# Patient Record
Sex: FEMALE | Race: WHITE | HISPANIC OR LATINO
[De-identification: names, ages, dates, MRNs, and addresses within clinical notes are randomized per-mention and may not be internally consistent; named-entity substitution may affect disease eponyms.]

---

## 2022-01-24 PROBLEM — Z00.00 ENCOUNTER FOR PREVENTIVE HEALTH EXAMINATION: Status: ACTIVE | Noted: 2022-01-24

## 2022-01-26 ENCOUNTER — NON-APPOINTMENT (OUTPATIENT)
Age: 27
End: 2022-01-26

## 2022-01-26 ENCOUNTER — APPOINTMENT (OUTPATIENT)
Dept: PHYSICAL MEDICINE AND REHAB | Facility: CLINIC | Age: 27
End: 2022-01-26
Payer: COMMERCIAL

## 2022-01-26 VITALS
BODY MASS INDEX: 21.34 KG/M2 | SYSTOLIC BLOOD PRESSURE: 106 MMHG | DIASTOLIC BLOOD PRESSURE: 47 MMHG | HEIGHT: 64 IN | TEMPERATURE: 97.7 F | WEIGHT: 125 LBS | HEART RATE: 58 BPM | RESPIRATION RATE: 18 BRPM

## 2022-01-26 DIAGNOSIS — S73.191D OTHER SPRAIN OF RIGHT HIP, SUBSEQUENT ENCOUNTER: ICD-10-CM

## 2022-01-26 DIAGNOSIS — M70.61 TROCHANTERIC BURSITIS, RIGHT HIP: ICD-10-CM

## 2022-01-26 DIAGNOSIS — Z82.49 FAMILY HISTORY OF ISCHEMIC HEART DISEASE AND OTHER DISEASES OF THE CIRCULATORY SYSTEM: ICD-10-CM

## 2022-01-26 DIAGNOSIS — M25.551 PAIN IN RIGHT HIP: ICD-10-CM

## 2022-01-26 DIAGNOSIS — R10.30 LOWER ABDOMINAL PAIN, UNSPECIFIED: ICD-10-CM

## 2022-01-26 DIAGNOSIS — Z78.9 OTHER SPECIFIED HEALTH STATUS: ICD-10-CM

## 2022-01-26 DIAGNOSIS — G89.29 PAIN IN RIGHT HIP: ICD-10-CM

## 2022-01-26 DIAGNOSIS — Z86.39 PERSONAL HISTORY OF OTHER ENDOCRINE, NUTRITIONAL AND METABOLIC DISEASE: ICD-10-CM

## 2022-01-26 DIAGNOSIS — M79.609 PAIN IN UNSPECIFIED LIMB: ICD-10-CM

## 2022-01-26 DIAGNOSIS — M25.561 PAIN IN RIGHT KNEE: ICD-10-CM
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
## 2022-01-26 PROCEDURE — 99204 OFFICE O/P NEW MOD 45 MIN: CPT

## 2022-01-26 RX ORDER — NDAC AND EE TABLETS AND FERROUS FUMARATE TABLETS 5-7-9-7
KIT ORAL
Refills: 0 | Status: ACTIVE | COMMUNITY

## 2022-01-26 RX ORDER — LEVOTHYROXINE SODIUM 137 UG/1
TABLET ORAL
Refills: 0 | Status: ACTIVE | COMMUNITY

## 2022-01-26 NOTE — CONSULT LETTER
[FreeTextEntry1] : Dear Dr. HONG  , \par \par I had the pleasure of evaluating your patient, ADRIEN BECKMAN .\par \par Thank you very much for allowing me to participate in the care of this patient. If you have any questions, please do not hesitate to contact me. \par \par Sincerely, \par Michele Lester MD \par \par ABPMR Board Certified in Physical Medicine and Rehabilitation\par Certified Fellow of AANEM (Neuromuscular and Electrodiagnostic Medicine)\par Subspecialty certified in Sports Medicine (ABPMR)\par \par  of Physical Medicine and Rehabilitation\par [employer] [facility]\par [facility]\par \par

## 2022-01-26 NOTE — ASSESSMENT
[FreeTextEntry1] : \par PLAN AND RECOMMENDATIONS :\par \par We discussed differential diagnosis and clinical impression\par I believe the knee pain may be referred from the hip \par we discussed Rx options \par advise PT including pelvic girdle PRE and pelvic floor ex \par The patient was given handouts to read on this condition,helpful hints ,exercises etc \par all questions answered\par \par Recommend\par .symptomatic care and support\par  medications NSAIDS as needed -  OTC fine (-personal preference )-(once or twice a day), -warned of  possible GI side effects -advised to take with meals or add over the counter Nexium, if sensitive\par \par  imaging done \par  referral to PT \par  hydrotherapy /heat / cold for pain\par  continue precautions including care with bending, lifting, twisting and  carrying.\par \par  relative rest and avoidance of painful activity where possible \par  increasing activity as discussed \par  return for follow up 4-6 weeks \par \par Total clinician time on the date of this encounter was at least 45 minutes- including\par \par 1 preparing to see the patient and review of prior notes, tests and other records \par 2 obtaining and reviewing and/ or confirming the history\par 3 performing a medically necessary, pertinent  and appropriate examination \par 4 ordering medications and supplements explaining side effects to look for ,tests,procedures,therapy and or specialty referrals -reviewing her MRI knee and hip reports and images \par 5 explaining the diagnosis or differential to the patient\par 6 communicating with other physicians or providers before during or after the visit.Her PCP \par \par

## 2022-01-26 NOTE — PHYSICAL EXAM
[FreeTextEntry1] : PHYSICAL EXAM : OBJECTIVE \par \par GENERAL : Awake ,alert and oriented to time place and person \par HEAD : normocephalic and atraumatic \par NECK : supple ,no tracheal deviation ,no thyroid enlargement noted with swallowing\par EYES : sclera and conjunctiva normal no redness,intact extraocular movements \par ENT  : ears and nose normal in appearance -hearing adequate \par PULMONARY: effort normal. No respiratory distress. breathing regular. No wheezes \par LYMPH : No swelling in limbs, capillary return within normal range \par CVS : warm extremities,no palpitations,not short of breath, no visible jugular venous distention\par PSYCH : mood and affect normal ,good eye contact ,normal attention \par ABDOMEN : no visible distension , \par NEUROLOGICAL:cranial nerves intact,muscle tone normal,gait and balance safe except where noted below \par SKIN : warm and dry No rash detected over specific body areas examined \par MUSCULOSKELETAL: normal muscle bulk, no focal bony tenderness /posture normal except where specified below\par R hip tender R groin and greater trochanter \par trigger points along add armen \par ROM tight at extremes of hip rotation \par tight hamstring on R \par knee exam nl no swelling non tender \par \par gait NL \par No long tract signs found on clinical exam and no focal neurological deficits\par

## 2022-01-26 NOTE — HISTORY OF PRESENT ILLNESS
[FreeTextEntry1] : Ms. ADRIEN BECKMAN is a very pleasant 26 year female who seen for evaluation of chronic R hip and knee pain  that has been ongoing for 3 years   without any specific injury or inciting event.\par she is very athletic and was doing circuit classes involving splits etc at the time  The pain is located primarily R groin and outer R knee  intermittent in nature and described as sharp . The pain is rated as 1/10 during today's visit, and ranges from 1-8/10. The patient's symptoms are aggravated by activity she is a  has to be careful   and alleviated by rest and stretching  . The patient denies any night pain, numbness/tingling, weakness, or bowel/bladder dysfunction. The patient has no other complaints at this time.\par she just moved from [place]\par MRI in sept 2021 showed small labral tear R hip \par knee MRI NL small bakers cyst \par she has report and images on her phone

## 2022-09-02 ENCOUNTER — NON-APPOINTMENT (OUTPATIENT)
Age: 27
End: 2022-09-02

## 2023-08-15 NOTE — REVIEW OF SYSTEMS
Detail Level: Zone [Patient Intake Form Reviewed] : Patient intake form was reviewed [Joint Pain] : joint pain [Joint Stiffness] : joint stiffness [Negative] : Heme/Lymph [Fever] : no fever [Lower Ext Edema] : no lower extremity edema [Muscle Weakness] : no muscle weakness [Difficulty Walking] : no difficulty walking

## 2025-08-25 ENCOUNTER — RESULT REVIEW (OUTPATIENT)
Age: 30
End: 2025-08-25

## 2025-08-25 ENCOUNTER — OUTPATIENT (OUTPATIENT)
Dept: OUTPATIENT SERVICES | Facility: HOSPITAL | Age: 30
LOS: 1 days | End: 2025-08-25
Payer: COMMERCIAL

## 2025-08-25 ENCOUNTER — APPOINTMENT (OUTPATIENT)
Dept: ORTHOPEDIC SURGERY | Facility: CLINIC | Age: 30
End: 2025-08-25
Payer: COMMERCIAL

## 2025-08-25 ENCOUNTER — NON-APPOINTMENT (OUTPATIENT)
Age: 30
End: 2025-08-25

## 2025-08-25 VITALS — HEART RATE: 69 BPM | OXYGEN SATURATION: 98 % | DIASTOLIC BLOOD PRESSURE: 63 MMHG | SYSTOLIC BLOOD PRESSURE: 115 MMHG

## 2025-08-25 DIAGNOSIS — M54.12 RADICULOPATHY, CERVICAL REGION: ICD-10-CM

## 2025-08-25 DIAGNOSIS — M77.02 MEDIAL EPICONDYLITIS, LEFT ELBOW: ICD-10-CM

## 2025-08-25 PROCEDURE — 73030 X-RAY EXAM OF SHOULDER: CPT | Mod: LT

## 2025-08-25 PROCEDURE — 73070 X-RAY EXAM OF ELBOW: CPT | Mod: LT

## 2025-08-25 PROCEDURE — 73070 X-RAY EXAM OF ELBOW: CPT | Mod: 26,LT

## 2025-08-25 PROCEDURE — 73030 X-RAY EXAM OF SHOULDER: CPT | Mod: 26,LT

## 2025-08-25 PROCEDURE — 99204 OFFICE O/P NEW MOD 45 MIN: CPT | Mod: 25

## 2025-09-09 ENCOUNTER — TRANSCRIPTION ENCOUNTER (OUTPATIENT)
Age: 30
End: 2025-09-09

## 2025-09-10 ENCOUNTER — NON-APPOINTMENT (OUTPATIENT)
Age: 30
End: 2025-09-10

## 2025-09-10 ENCOUNTER — APPOINTMENT (OUTPATIENT)
Dept: PHYSICAL MEDICINE AND REHAB | Facility: CLINIC | Age: 30
End: 2025-09-10
Payer: COMMERCIAL

## 2025-09-10 VITALS — BODY MASS INDEX: 22.02 KG/M2 | HEIGHT: 64 IN | WEIGHT: 129 LBS

## 2025-09-10 VITALS — HEIGHT: 64 IN | WEIGHT: 129 LBS | RESPIRATION RATE: 16 BRPM | BODY MASS INDEX: 22.02 KG/M2 | HEART RATE: 66 BPM

## 2025-09-10 DIAGNOSIS — M77.02 MEDIAL EPICONDYLITIS, LEFT ELBOW: ICD-10-CM

## 2025-09-10 DIAGNOSIS — M54.12 RADICULOPATHY, CERVICAL REGION: ICD-10-CM

## 2025-09-10 DIAGNOSIS — Z80.7 FAMILY HISTORY OF OTHER MALIGNANT NEOPLASMS OF LYMPHOID, HEMATOPOIETIC AND RELATED TISSUES: ICD-10-CM

## 2025-09-10 DIAGNOSIS — M54.2 CERVICALGIA: ICD-10-CM

## 2025-09-10 DIAGNOSIS — G89.29 CERVICALGIA: ICD-10-CM

## 2025-09-10 PROCEDURE — 99203 OFFICE O/P NEW LOW 30 MIN: CPT | Mod: 95

## 2025-09-18 ENCOUNTER — APPOINTMENT (OUTPATIENT)
Dept: PHYSICAL MEDICINE AND REHAB | Facility: CLINIC | Age: 30
End: 2025-09-18
Payer: COMMERCIAL

## 2025-09-18 VITALS
HEART RATE: 49 BPM | HEIGHT: 64 IN | DIASTOLIC BLOOD PRESSURE: 60 MMHG | BODY MASS INDEX: 22.02 KG/M2 | OXYGEN SATURATION: 97 % | RESPIRATION RATE: 16 BRPM | WEIGHT: 129 LBS | SYSTOLIC BLOOD PRESSURE: 108 MMHG

## 2025-09-18 DIAGNOSIS — R20.2 ANESTHESIA OF SKIN: ICD-10-CM
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
## 2025-09-18 DIAGNOSIS — G56.22 LESION OF ULNAR NERVE, LEFT UPPER LIMB: ICD-10-CM

## 2025-09-18 DIAGNOSIS — R20.0 ANESTHESIA OF SKIN: ICD-10-CM

## 2025-09-18 PROCEDURE — 95886 MUSC TEST DONE W/N TEST COMP: CPT

## 2025-09-18 PROCEDURE — 95913 NRV CNDJ TEST 13/> STUDIES: CPT
